# Patient Record
Sex: FEMALE | Race: OTHER | HISPANIC OR LATINO | Employment: UNEMPLOYED | URBAN - NONMETROPOLITAN AREA
[De-identification: names, ages, dates, MRNs, and addresses within clinical notes are randomized per-mention and may not be internally consistent; named-entity substitution may affect disease eponyms.]

---
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
## 2023-01-26 ENCOUNTER — APPOINTMENT (OUTPATIENT)
Dept: LAB | Facility: CLINIC | Age: 18
End: 2023-01-26

## 2023-01-26 ENCOUNTER — CONSULT (OUTPATIENT)
Dept: GASTROENTEROLOGY | Facility: CLINIC | Age: 18
End: 2023-01-26

## 2023-01-26 VITALS
SYSTOLIC BLOOD PRESSURE: 110 MMHG | HEIGHT: 63 IN | WEIGHT: 189 LBS | HEART RATE: 87 BPM | TEMPERATURE: 98.2 F | BODY MASS INDEX: 33.49 KG/M2 | DIASTOLIC BLOOD PRESSURE: 62 MMHG | OXYGEN SATURATION: 99 %

## 2023-01-26 DIAGNOSIS — R10.10 UPPER ABDOMINAL PAIN: ICD-10-CM

## 2023-01-26 DIAGNOSIS — R79.89 ABNORMAL LIVER FUNCTION TESTS: Primary | ICD-10-CM

## 2023-01-26 DIAGNOSIS — R79.89 ABNORMAL LIVER FUNCTION TESTS: ICD-10-CM

## 2023-01-26 DIAGNOSIS — R10.9 ABDOMINAL PAIN: ICD-10-CM

## 2023-01-26 LAB
ALBUMIN SERPL BCP-MCNC: 3.6 G/DL (ref 3.5–5)
ALP SERPL-CCNC: 107 U/L (ref 46–116)
ALT SERPL W P-5'-P-CCNC: 18 U/L (ref 12–78)
ANION GAP SERPL CALCULATED.3IONS-SCNC: 6 MMOL/L (ref 4–13)
AST SERPL W P-5'-P-CCNC: 11 U/L (ref 5–45)
BILIRUB SERPL-MCNC: 0.59 MG/DL (ref 0.2–1)
BUN SERPL-MCNC: 6 MG/DL (ref 5–25)
CALCIUM SERPL-MCNC: 9.1 MG/DL (ref 8.3–10.1)
CHLORIDE SERPL-SCNC: 110 MMOL/L (ref 96–108)
CO2 SERPL-SCNC: 26 MMOL/L (ref 21–32)
CREAT SERPL-MCNC: 0.81 MG/DL (ref 0.6–1.3)
FERRITIN SERPL-MCNC: 14 NG/ML (ref 8–388)
GFR SERPL CREATININE-BSD FRML MDRD: 104 ML/MIN/1.73SQ M
GLUCOSE P FAST SERPL-MCNC: 107 MG/DL (ref 65–99)
POTASSIUM SERPL-SCNC: 3.3 MMOL/L (ref 3.5–5.3)
PROT SERPL-MCNC: 7.6 G/DL (ref 6.4–8.4)
SODIUM SERPL-SCNC: 142 MMOL/L (ref 135–147)
TSH SERPL DL<=0.05 MIU/L-ACNC: 3.25 UIU/ML (ref 0.45–4.5)

## 2023-01-26 RX ORDER — OMEPRAZOLE 20 MG/1
20 CAPSULE, DELAYED RELEASE ORAL DAILY
Qty: 30 CAPSULE | Refills: 11 | Status: SHIPPED | OUTPATIENT
Start: 2023-01-26

## 2023-01-26 NOTE — PROGRESS NOTES
[facility] - Outpatient Consultation  Poornima Mercado 19 y o  female MRN: [id number]  Encounter: [id number]          ASSESSMENT AND PLAN:    18F with history of gallstone pancreatitis s/p cholecystectomy referred from the ED with upper abdominal pain and abnormal liver test   Upper abdominal pain is atypical for a GI etiology given it sounds pleuritic however she does endorse improvement with PPI suggesting there may be a peptic component to it  Transaminases significantly elevated in December, will repeat these today and assess for underlying liver disease  1  Abdominal pain  - Ambulatory Referral to Gastroenterology    2  Abnormal liver function tests  - US right upper quadrant; Future  - Comprehensive metabolic panel; Future  - Anti-smooth muscle antibody, IgG; Future  - Ceruloplasmin; Future  - Ferritin; Future  - Antimitochondrial antibody; Future  - TSH, 3rd generation; Future  - Alpha 1 Antitrypsin Phenotype; Future    3  Upper abdominal pain  - US right upper quadrant; Future  - omeprazole (PriLOSEC) 20 mg delayed release capsule; Take 1 capsule (20 mg total) by mouth daily  Dispense: 30 capsule; Refill: 11  -If no etiology present on ultrasound and no resolution on omeprazole, may require EGD    ______________________________________________________________________    HPI:    Has been having a lot of pain on both sides of upper abdomen  Has been going on for 2 years  Pain feels worse with breathing in, feels like a pushing  Comes and goes, usually lasts for about an hour  Initially started with LUQ  Not affected by eating  No alleviating factors  ED gave her pantoprazole which helped  Pain came back when she ran out of pills  Also given dicycolmine, doesn't think it helped  No heartburn, nausea, vomiting, dysphagia  No weight loss, has gained 35# over 6 months  Had gallbladder removed summer 2022 after gallstone pancreatitis      No constipation, diarrhea, or blood in stool     No family history of IBD or GI malignancy  Vapes nicotine but quit when abdominal pain started  No alcohol  Reviewed 12/2/22 ED documentation    REVIEW OF SYSTEMS:    CONSTITUTIONAL: Denies any fever, chills, rigors, and weight loss  HEENT: No earache or tinnitus  Denies hearing loss or visual disturbances  CARDIOVASCULAR: No chest pain or palpitations  RESPIRATORY: Denies any cough, hemoptysis, shortness of breath or dyspnea on exertion  GASTROINTESTINAL: As noted in the History of Present Illness  GENITOURINARY: No problems with urination  Denies any hematuria or dysuria  NEUROLOGIC: No dizziness or vertigo, denies headaches  MUSCULOSKELETAL: Denies any muscle or joint pain  SKIN: Denies skin rashes or itching  ENDOCRINE: Denies excessive thirst  Denies intolerance to heat or cold  PSYCHOSOCIAL: Denies depression or anxiety  Denies any recent memory loss         Historical Information   Past Medical History:   Diagnosis Date   • Asthma      Past Surgical History:   Procedure Laterality Date   • CHOLECYSTECTOMY LAPAROSCOPIC N/A 7/15/2022    Procedure: CHOLECYSTECTOMY LAPAROSCOPIC;  Surgeon: Jorge Walker DO;  Location: [facility];  Service: General     Social History   Social History     Substance and Sexual Activity   Alcohol Use Never     Social History     Substance and Sexual Activity   Drug Use Never     Social History     Tobacco Use   Smoking Status Never   Smokeless Tobacco Never     Family History   Problem Relation Age of Onset   • No Known Problems Father        Meds/Allergies       Current Outpatient Medications:   •  dicyclomine (BENTYL) 20 mg tablet  •  medroxyPROGESTERone acetate (DEPO-PROVERA SYRINGE) 150 mg/mL injection  •  naproxen (Naprosyn) 500 mg tablet  •  omeprazole (PriLOSEC) 20 mg delayed release capsule  •  pantoprazole (PROTONIX) 20 mg tablet    No Known Allergies        Objective     Blood pressure 110/62, pulse 87, temperature 98 2 °F (36 8 °C), temperature source Temporal, height 5' 3" (1 6 m), weight 85 7 kg (189 lb), SpO2 99 %  Body mass index is 33 48 kg/m²  PHYSICAL EXAM:      General Appearance:   Alert, cooperative, no distress   HEENT:   Normocephalic, atraumatic, anicteric  Neck:  Supple, symmetrical, trachea midline   Lungs:   Clear to auscultation bilaterally; no rales, rhonchi or wheezing; respirations unlabored    Heart[de-identified]   Regular rate and rhythm; no murmur, rub, or gallop  Abdomen:   Soft, non-tender, non-distended; normal bowel sounds; no masses, no organomegaly    Genitalia:   Deferred    Rectal:   Deferred    Extremities:  No cyanosis, clubbing or edema    Pulses:  2+ and symmetric    Skin:  No jaundice, rashes, or lesions    Lymph nodes:  No palpable cervical lymphadenopathy        Lab Results:   No visits with results within 1 Day(s) from this visit     Latest known visit with results is:   Admission on 12/02/2022, Discharged on 12/02/2022   Component Date Value   • WBC 12/02/2022 6 55    • RBC 12/02/2022 4 74    • Hemoglobin 12/02/2022 13 1    • Hematocrit 12/02/2022 40 8    • MCV 12/02/2022 86    • MCH 12/02/2022 27 6    • MCHC 12/02/2022 32 1    • RDW 12/02/2022 12 7    • MPV 12/02/2022 10 5    • Platelets 11/69/8753 321    • nRBC 12/02/2022 0    • Neutrophils Relative 12/02/2022 67    • Immat GRANS % 12/02/2022 0    • Lymphocytes Relative 12/02/2022 24    • Monocytes Relative 12/02/2022 6    • Eosinophils Relative 12/02/2022 2    • Basophils Relative 12/02/2022 1    • Neutrophils Absolute 12/02/2022 4 35    • Immature Grans Absolute 12/02/2022 0 01    • Lymphocytes Absolute 12/02/2022 1 60    • Monocytes Absolute 12/02/2022 0 41    • Eosinophils Absolute 12/02/2022 0 12    • Basophils Absolute 12/02/2022 0 06    • Sodium 12/02/2022 138    • Potassium 12/02/2022 3 6    • Chloride 12/02/2022 104    • CO2 12/02/2022 27    • ANION GAP 12/02/2022 7    • BUN 12/02/2022 8    • Creatinine 12/02/2022 0 79    • Glucose 12/02/2022 100    • Calcium 12/02/2022 9 2    • AST 12/02/2022 164 (H)    • ALT 12/02/2022 524 (H)    • Alkaline Phosphatase 12/02/2022 223    • Total Protein 12/02/2022 8 6 (H)    • Albumin 12/02/2022 4 2    • Total Bilirubin 12/02/2022 0 83    • eGFR 12/02/2022 108    • Lipase 12/02/2022 127    • EXT Preg Test, Ur 12/02/2022 Negative    • Control 12/02/2022 Valid    • Color, UA 12/02/2022 Yellow    • Clarity, UA 12/02/2022 Clear    • Specific Gravity, UA 12/02/2022 >=1 030    • pH, UA 12/02/2022 6 0    • Leukocytes, UA 12/02/2022 Negative    • Nitrite, UA 12/02/2022 Negative    • Protein, UA 12/02/2022 Negative    • Glucose, UA 12/02/2022 Negative    • Ketones, UA 12/02/2022 Negative    • Urobilinogen, UA 12/02/2022 0 2    • Bilirubin, UA 12/02/2022 Small (A)    • Occult Blood, UA 12/02/2022 Trace-Intact (A)    • RBC, UA 12/02/2022 1-2    • WBC, UA 12/02/2022 2-4    • Epithelial Cells 12/02/2022 Moderate (A)    • Bacteria, UA 12/02/2022 Moderate (A)    • Ventricular Rate 12/02/2022 78    • Atrial Rate 12/02/2022 78    • HI Interval 12/02/2022 158    • QRSD Interval 12/02/2022 80    • QT Interval 12/02/2022 384    • QTC Interval 12/02/2022 437    • P Axis 12/02/2022 62    • QRS Axis 12/02/2022 44    • T Wave Axis 12/02/2022 31          Radiology Results:   No results found

## 2023-01-27 LAB
ACTIN IGG SERPL-ACNC: 8 UNITS (ref 0–19)
CERULOPLASMIN SERPL-MCNC: 27.2 MG/DL (ref 19–39)
MITOCHONDRIA M2 IGG SER-ACNC: <20 UNITS (ref 0–20)

## 2023-02-02 LAB
A1AT PHENOTYP SERPL IFE: NORMAL
A1AT SERPL-MCNC: 145 MG/DL (ref 100–188)

## 2023-02-07 ENCOUNTER — HOSPITAL ENCOUNTER (OUTPATIENT)
Dept: ULTRASOUND IMAGING | Facility: HOSPITAL | Age: 18
Discharge: HOME | End: 2023-02-07
Attending: STUDENT IN AN ORGANIZED HEALTH CARE EDUCATION/TRAINING PROGRAM

## 2023-02-07 DIAGNOSIS — R10.10 UPPER ABDOMINAL PAIN: ICD-10-CM

## 2023-02-07 DIAGNOSIS — R79.89 ABNORMAL LIVER FUNCTION TESTS: ICD-10-CM

## 2023-02-22 ENCOUNTER — OFFICE VISIT (OUTPATIENT)
Dept: DENTISTRY | Facility: CLINIC | Age: 18
End: 2023-02-22

## 2023-02-22 VITALS — DIASTOLIC BLOOD PRESSURE: 81 MMHG | SYSTOLIC BLOOD PRESSURE: 118 MMHG

## 2023-02-22 DIAGNOSIS — K03.6 ACCRETIONS ON TEETH: Primary | ICD-10-CM

## 2023-02-22 NOTE — PROGRESS NOTES
Adult Prophy  Chief Complaint   Patient presents with   • Routine Oral Cleaning    No dental concerns or issues   Pt missed her comp exam- I rescheduled the appt today  Method Used:  · Prophy Method Used: Hand Scaling  · Polished  · Flossed  Fluoride    Radiographs Taken in [facility]: (Taken to assess periodontal health)  · None      Intra/Extra Oral Cancer Screening:  · Within normal limits    Oral Hygiene:  · Fair    Plaque:  · Light    Calculus:  · Light    Bleeding:  · Light    Stain:  · Light    Periodontal Charting: Showed patient sub calc specks and bone height  · Spot probing  1-4mm    Periodontal Classification:  · Generalized  · Mild  · Gingivitis    Oral Hygiene Instruction:    Went over daily routine and c-shaped flossing  Demonstrated flossing  No orders of the defined types were placed in this encounter         Next Visit:  6 Month jaden EVANS  Dentist visit- comp

## 2023-03-20 ENCOUNTER — OFFICE VISIT (OUTPATIENT)
Dept: DENTISTRY | Facility: CLINIC | Age: 18
End: 2023-03-20

## 2023-03-20 VITALS — SYSTOLIC BLOOD PRESSURE: 108 MMHG | HEART RATE: 77 BPM | DIASTOLIC BLOOD PRESSURE: 72 MMHG

## 2023-03-20 DIAGNOSIS — Z01.20 ENCOUNTER FOR DENTAL EXAMINATION: Primary | ICD-10-CM

## 2023-03-20 NOTE — PROGRESS NOTES
Comprehensive Exam    Mookie Watts presents for a comprehensive exam  Verbal consent for treatment given in addition to the forms  Reviewed health history - Patient is ASA    Consents signed: Yes    Perio: Gingivitis  Pain Scale: 0  Caries Assessment: high  Radiographs:  Films are current    Oral Hygiene instruction reviewed and given  Hygiene recall visits recommended to the patient  Treatment Plan:  1  Periodontal therapy: prophy  2  Caries control: resins, reinforced OHI, nutritional counseling (no sipping on anything but water)  Pt aware that there are cavities starting between some teeth and to help prevent them from growing, pt needs to floss daily     3  3rd molar ext    Prognosis is Good  Referrals needed: Referred to OS for 3rd molar ext  Next visit: luzma

## 2023-04-03 ENCOUNTER — OFFICE VISIT (OUTPATIENT)
Dept: GASTROENTEROLOGY | Facility: CLINIC | Age: 18
End: 2023-04-03

## 2023-04-03 VITALS
SYSTOLIC BLOOD PRESSURE: 120 MMHG | OXYGEN SATURATION: 97 % | HEIGHT: 63 IN | RESPIRATION RATE: 16 BRPM | WEIGHT: 203 LBS | DIASTOLIC BLOOD PRESSURE: 60 MMHG | BODY MASS INDEX: 35.97 KG/M2 | TEMPERATURE: 97.7 F | HEART RATE: 74 BPM

## 2023-04-03 DIAGNOSIS — K85.10 GALLSTONE PANCREATITIS: ICD-10-CM

## 2023-04-03 DIAGNOSIS — R10.13 DYSPEPSIA: ICD-10-CM

## 2023-04-03 DIAGNOSIS — R79.89 ABNORMAL LIVER FUNCTION TESTS: ICD-10-CM

## 2023-04-03 DIAGNOSIS — R10.11 RUQ PAIN: Primary | ICD-10-CM

## 2023-04-03 RX ORDER — MEDROXYPROGESTERONE ACETATE 150 MG/ML
INJECTION, SUSPENSION INTRAMUSCULAR
COMMUNITY
Start: 2023-01-30

## 2023-04-03 NOTE — PROGRESS NOTES
[facility] - Outpatient Follow-up Note  Richelle Seo 19 y o  female MRN: [id number]  Encounter: [id number]    ASSESSMENT AND PLAN:      1  RUQ pain  2  Dyspepsia    Pt is s/p cholecystectomy  She recently underwent evaluation with RUQ US, which was wnl  She also had LFTs re-checked, and they were in normal range as of 01/2023  Interestingly, PPI helped with upper abdominal pain, though pt was not having any typical reflux symptoms  At this time, she is asymptomatic  Tolerating PO without issue, no alarm features  We discussed continuing on PPI now, as there may have been a peptic process associated with upper abdominal pain  We discussed plan to taper PPI in follow up  Recommend continuing with small frequent meals, low in saturated fats  Continue with anti-reflux diet and lifestyle modifications  No alarm features that would warrant EGD at this time, thus we will continue to monitor conservatively  3  Gallstone pancreatitis  4  Abnormal liver function tests    Pt had elevated LFTs during hospitalization for gallstone pancreatitis, mixed pattern with elevated bilirubin  Viral hepatitis labs were negative  This abated after cholecystectomy however she had recurrence of elevated LFTs during ER evaluation for abdominal pain, purely in hepatocellular pattern  She had serologic evaluation which was negative for autoimmune and genetic abnormalities  LFTs have since normalized  We discussed latter elevation may have been secondary to infection, low flow state, or lower likelihood of passed stone  Liver parenchyma normal on recent US from 01/2023  At this time, we discussed continuing to monitor at this time, and will repeat in 3-6 months to ensure stability      - Comprehensive metabolic panel; Future    Plan to follow up in approx 6 months with labs prior  ______________________________________________________________________    SUBJECTIVE: Patient is a 19 y o  female who presents today for follow-up regarding abdominal pain  Past medical history significant for gallstone pancreatitis s/p cholecystectomy, asthma  She was initially evaluated in 07/2022 for abdominal pain found to have cholelithiasis and elevated LFTs  MRCP was negative, there was concern that she may have passed gallstones  She underwent a laparoscopic cholecystectomy during that admission  She subsequently saw Dr Kathryn Soliman in follow-up in 01/2023 as she was still having bilateral upper abdominal pain  Felt worse with breathing in, felt like a pressure  Pantoprazole helped  She underwent evaluation with right upper quadrant ultrasound, which was unremarkable  Her LFTs returned to normal when rechecked  04/03/23:     Patient is feeling so much better today  She denies any recurrence of upper abdominal pain  Denies having pain with deep breathing  She denies any significant heartburn, indigestion, nausea, vomiting, dysphagia or odynophagia  She denies any unintentional weight loss since last office visit  She denies any yellowing of the eyes or skin  No new abdominal swelling or lower extremity edema  No pruritus or confusion  She denies any issues moving her bowels, no chronic constipation or diarrhea  No abdominal pain or rectal pain related to defecation  No BRBPR or melena  No family history of CRC      Tobacco: none   Etoh: none   NSAIDs: prn     01/2023: RUQ US: normal   01/2023: AST 11, ALT 18, , albumin 3 6, T  bili 0 59, ASMA 8, ceruloplasmin 27 2, ferritin 14, AMA < 20, TSH 3 25, A1AT 145  12/2022: Hb 13 1, MCV 86, platelets 393, BUN 8, creatinine 0 79, , , , T  bili 0 83, lipase 127  12/2022: CT A/P: no acute pathology   07/2022: Acute and chronic hepatitis panel negative    Review of Systems   Per HPI    Historical Information   Past Medical History:   Diagnosis Date   • Asthma      Past Surgical History:   Procedure Laterality Date   • CHOLECYSTECTOMY LAPAROSCOPIC N/A 7/15/2022    Procedure: CHOLECYSTECTOMY LAPAROSCOPIC;  Surgeon: Rigoberto Wilson DO;  Location: [facility];  Service: General     Social History   Social History     Substance and Sexual Activity   Alcohol Use Never     Social History     Substance and Sexual Activity   Drug Use Never     Social History     Tobacco Use   Smoking Status Never   Smokeless Tobacco Never     Family History   Problem Relation Age of Onset   • No Known Problems Father        Meds/Allergies       Current Outpatient Medications:   •  medroxyPROGESTERone (DEPO-PROVERA) 150 mg/mL injection  •  medroxyPROGESTERone acetate (DEPO-PROVERA SYRINGE) 150 mg/mL injection  •  omeprazole (PriLOSEC) 20 mg delayed release capsule    No Known Allergies    Objective     There were no vitals taken for this visit  There is no height or weight on file to calculate BMI  Physical Exam  Vitals and nursing note reviewed  Constitutional:       Appearance: Normal appearance  HENT:      Head: Normocephalic and atraumatic  Eyes:      General: No scleral icterus  Conjunctiva/sclera: Conjunctivae normal    Cardiovascular:      Rate and Rhythm: Normal rate  Pulmonary:      Effort: Pulmonary effort is normal  No respiratory distress  Abdominal:      General: Abdomen is flat  Bowel sounds are normal  There is no distension  Palpations: Abdomen is soft  There is no mass  Tenderness: There is no abdominal tenderness  There is no guarding or rebound  Skin:     General: Skin is warm and dry  Coloration: Skin is not jaundiced  Neurological:      General: No focal deficit present  Mental Status: She is alert and oriented to person, place, and time  Psychiatric:         Mood and Affect: Mood normal          Behavior: Behavior normal        Lab Results:   No visits with results within 1 Day(s) from this visit     Latest known visit with results is:   Appointment on 01/26/2023   Component Date Value   • Sodium 01/26/2023 142    • Potassium 01/26/2023 3 3 (L)    • Chloride 01/26/2023 110 (H)    • CO2 01/26/2023 26    • ANION GAP 01/26/2023 6    • BUN 01/26/2023 6    • Creatinine 01/26/2023 0 81    • Glucose, Fasting 01/26/2023 107 (H)    • Calcium 01/26/2023 9 1    • AST 01/26/2023 11    • ALT 01/26/2023 18    • Alkaline Phosphatase 01/26/2023 107    • Total Protein 01/26/2023 7 6    • Albumin 01/26/2023 3 6    • Total Bilirubin 01/26/2023 0 59    • eGFR 01/26/2023 104    • Smooth Muscle Ab 01/26/2023 8    • Ceruloplasmin 01/26/2023 27 2    • Ferritin 01/26/2023 14    • Mitochondrial Ab 01/26/2023 <20 0    • TSH 3RD GENERATON 01/26/2023 3 250    • A-1 Antitrypsin 01/26/2023 145    • A-1 Antitrypsin Pheno 01/26/2023 MM      Radiology Results:   No results found  Rosalva Alexander PA-C    **Please note:  Dictation voice to text software may have been used in the creation of this record  Occasional wrong word or “sound alike” substitutions may have occurred due to the inherent limitations of voice recognition software  Read the chart carefully and recognize, using context, where substitutions have occurred  **

## 2023-05-05 ENCOUNTER — APPOINTMENT (OUTPATIENT)
Dept: LAB | Facility: MEDICAL CENTER | Age: 18
End: 2023-05-05

## 2023-05-05 DIAGNOSIS — R79.89 ABNORMAL LIVER FUNCTION TESTS: ICD-10-CM

## 2023-05-06 LAB
ALBUMIN SERPL BCP-MCNC: 3.9 G/DL (ref 3.5–5)
ALP SERPL-CCNC: 107 U/L (ref 46–116)
ALT SERPL W P-5'-P-CCNC: 20 U/L (ref 12–78)
ANION GAP SERPL CALCULATED.3IONS-SCNC: 3 MMOL/L (ref 4–13)
AST SERPL W P-5'-P-CCNC: 14 U/L (ref 5–45)
BILIRUB SERPL-MCNC: 0.52 MG/DL (ref 0.2–1)
BUN SERPL-MCNC: 7 MG/DL (ref 5–25)
CALCIUM SERPL-MCNC: 9.5 MG/DL (ref 8.3–10.1)
CHLORIDE SERPL-SCNC: 110 MMOL/L (ref 96–108)
CO2 SERPL-SCNC: 23 MMOL/L (ref 21–32)
CREAT SERPL-MCNC: 0.82 MG/DL (ref 0.6–1.3)
GFR SERPL CREATININE-BSD FRML MDRD: 103 ML/MIN/1.73SQ M
GLUCOSE SERPL-MCNC: 136 MG/DL (ref 65–140)
POTASSIUM SERPL-SCNC: 3.6 MMOL/L (ref 3.5–5.3)
PROT SERPL-MCNC: 8 G/DL (ref 6.4–8.4)
SODIUM SERPL-SCNC: 136 MMOL/L (ref 135–147)

## 2023-08-02 ENCOUNTER — APPOINTMENT (EMERGENCY)
Dept: RADIOLOGY | Facility: HOSPITAL | Age: 18
End: 2023-08-02
Payer: COMMERCIAL

## 2023-08-02 ENCOUNTER — HOSPITAL ENCOUNTER (EMERGENCY)
Facility: HOSPITAL | Age: 18
Discharge: HOME | End: 2023-08-02
Attending: EMERGENCY MEDICINE
Payer: COMMERCIAL

## 2023-08-02 ENCOUNTER — APPOINTMENT (EMERGENCY)
Dept: CT IMAGING | Facility: HOSPITAL | Age: 18
End: 2023-08-02
Payer: COMMERCIAL

## 2023-08-02 VITALS
OXYGEN SATURATION: 99 % | RESPIRATION RATE: 18 BRPM | DIASTOLIC BLOOD PRESSURE: 67 MMHG | HEART RATE: 96 BPM | TEMPERATURE: 97.8 F | SYSTOLIC BLOOD PRESSURE: 121 MMHG

## 2023-08-02 DIAGNOSIS — R19.00 ABDOMINAL MASS: ICD-10-CM

## 2023-08-02 DIAGNOSIS — R10.9 ABDOMINAL PAIN: Primary | ICD-10-CM

## 2023-08-02 LAB
ALBUMIN SERPL BCP-MCNC: 4.4 G/DL (ref 3.5–5)
ALP SERPL-CCNC: 118 U/L (ref 34–104)
ALT SERPL W P-5'-P-CCNC: 13 U/L (ref 7–52)
ANION GAP SERPL CALCULATED.3IONS-SCNC: 8 MMOL/L
AST SERPL W P-5'-P-CCNC: 17 U/L (ref 13–39)
BASOPHILS # BLD AUTO: 0.06 THOUSANDS/ÂΜL (ref 0–0.1)
BASOPHILS NFR BLD AUTO: 1 % (ref 0–1)
BILIRUB SERPL-MCNC: 0.46 MG/DL (ref 0.2–1)
BILIRUB UR QL STRIP: NEGATIVE
BUN SERPL-MCNC: 7 MG/DL (ref 5–25)
CALCIUM SERPL-MCNC: 9.8 MG/DL (ref 8.4–10.2)
CARDIAC TROPONIN I PNL SERPL HS: <2 NG/L
CHLORIDE SERPL-SCNC: 107 MMOL/L (ref 96–108)
CLARITY UR: CLEAR
CO2 SERPL-SCNC: 23 MMOL/L (ref 21–32)
COLOR UR: YELLOW
CREAT SERPL-MCNC: 0.71 MG/DL (ref 0.6–1.3)
D DIMER PPP FEU-MCNC: <0.27 UG/ML FEU
EOSINOPHIL # BLD AUTO: 0.08 THOUSAND/ÂΜL (ref 0–0.61)
EOSINOPHIL NFR BLD AUTO: 1 % (ref 0–6)
ERYTHROCYTE [DISTWIDTH] IN BLOOD BY AUTOMATED COUNT: 13.3 % (ref 11.6–15.1)
EXT PREGNANCY TEST URINE: NEGATIVE
EXT. CONTROL: NORMAL
GFR SERPL CREATININE-BSD FRML MDRD: 122 ML/MIN/1.73SQ M
GLUCOSE SERPL-MCNC: 96 MG/DL (ref 65–140)
GLUCOSE UR STRIP-MCNC: NEGATIVE MG/DL
HCT VFR BLD AUTO: 38.4 % (ref 34.8–46.1)
HGB BLD-MCNC: 12.3 G/DL (ref 11.5–15.4)
HGB UR QL STRIP.AUTO: NEGATIVE
IMM GRANULOCYTES # BLD AUTO: 0.03 THOUSAND/UL (ref 0–0.2)
IMM GRANULOCYTES NFR BLD AUTO: 0 % (ref 0–2)
KETONES UR STRIP-MCNC: NEGATIVE MG/DL
LEUKOCYTE ESTERASE UR QL STRIP: NEGATIVE
LIPASE SERPL-CCNC: 22 U/L (ref 11–82)
LYMPHOCYTES # BLD AUTO: 2.35 THOUSANDS/ÂΜL (ref 0.6–4.47)
LYMPHOCYTES NFR BLD AUTO: 22 % (ref 14–44)
MCH RBC QN AUTO: 26.7 PG (ref 26.8–34.3)
MCHC RBC AUTO-ENTMCNC: 32 G/DL (ref 31.4–37.4)
MCV RBC AUTO: 84 FL (ref 82–98)
MONOCYTES # BLD AUTO: 0.84 THOUSAND/ÂΜL (ref 0.17–1.22)
MONOCYTES NFR BLD AUTO: 8 % (ref 4–12)
NEUTROPHILS # BLD AUTO: 7.33 THOUSANDS/ÂΜL (ref 1.85–7.62)
NEUTS SEG NFR BLD AUTO: 68 % (ref 43–75)
NITRITE UR QL STRIP: NEGATIVE
NRBC BLD AUTO-RTO: 0 /100 WBCS
PH UR STRIP.AUTO: 6 [PH]
PLATELET # BLD AUTO: 336 THOUSANDS/UL (ref 149–390)
PMV BLD AUTO: 10.1 FL (ref 8.9–12.7)
POTASSIUM SERPL-SCNC: 3.7 MMOL/L (ref 3.5–5.3)
PROT SERPL-MCNC: 8 G/DL (ref 6.4–8.4)
PROT UR STRIP-MCNC: NEGATIVE MG/DL
RBC # BLD AUTO: 4.6 MILLION/UL (ref 3.81–5.12)
SODIUM SERPL-SCNC: 138 MMOL/L (ref 135–147)
SP GR UR STRIP.AUTO: >=1.03 (ref 1–1.03)
UROBILINOGEN UR QL STRIP.AUTO: 0.2 E.U./DL
WBC # BLD AUTO: 10.69 THOUSAND/UL (ref 4.31–10.16)

## 2023-08-02 PROCEDURE — 81003 URINALYSIS AUTO W/O SCOPE: CPT | Performed by: PHYSICIAN ASSISTANT

## 2023-08-02 PROCEDURE — G1004 CDSM NDSC: HCPCS

## 2023-08-02 PROCEDURE — 99284 EMERGENCY DEPT VISIT MOD MDM: CPT | Performed by: PHYSICIAN ASSISTANT

## 2023-08-02 PROCEDURE — 71045 X-RAY EXAM CHEST 1 VIEW: CPT

## 2023-08-02 PROCEDURE — 85025 COMPLETE CBC W/AUTO DIFF WBC: CPT | Performed by: PHYSICIAN ASSISTANT

## 2023-08-02 PROCEDURE — 80053 COMPREHEN METABOLIC PANEL: CPT | Performed by: PHYSICIAN ASSISTANT

## 2023-08-02 PROCEDURE — 84484 ASSAY OF TROPONIN QUANT: CPT | Performed by: PHYSICIAN ASSISTANT

## 2023-08-02 PROCEDURE — 74177 CT ABD & PELVIS W/CONTRAST: CPT

## 2023-08-02 PROCEDURE — 81025 URINE PREGNANCY TEST: CPT | Performed by: PHYSICIAN ASSISTANT

## 2023-08-02 PROCEDURE — 85379 FIBRIN DEGRADATION QUANT: CPT | Performed by: PHYSICIAN ASSISTANT

## 2023-08-02 PROCEDURE — 83690 ASSAY OF LIPASE: CPT | Performed by: PHYSICIAN ASSISTANT

## 2023-08-02 PROCEDURE — 93005 ELECTROCARDIOGRAM TRACING: CPT

## 2023-08-02 PROCEDURE — 36415 COLL VENOUS BLD VENIPUNCTURE: CPT | Performed by: PHYSICIAN ASSISTANT

## 2023-08-02 RX ADMIN — IOHEXOL 100 ML: 350 INJECTION, SOLUTION INTRAVENOUS at 10:45

## 2023-08-02 NOTE — Clinical Note
David April was seen and treated in our emergency department on 8/2/2023. No restrictions            Diagnosis: abd pain    Zeenat  . She may return on this date: 08/03/2023         If you have any questions or concerns, please don't hesitate to call.       Flora Neely PA-C    ______________________________           _______________          _______________  Hospital Representative                              Date                                Time

## 2023-08-02 NOTE — Clinical Note
Kyle Farfan accompanied Kvng Singh to the emergency department on 8/2/2023. Return date if applicable: 14/52/5386    Aidan Osbaldo is excused from work on 8/2/23    If you have any questions or concerns, please don't hesitate to call.       Ana Rosa Cool PA-C

## 2023-08-02 NOTE — ED PROVIDER NOTES
History  Chief Complaint   Patient presents with   • Abdominal Pain     Pt c/o left sided abdominal pain for 2 weeks, usually takes Omeprazole but states it recently stopped working. Denies n/v/d     This is a 68-year-old female who presents via private vehicle with family stating that she has been having left upper quadrant/left lower chest pain for about 2 weeks. She states initially was intermittent however over the last 12 hours it has become more severe and more constant. There is no other associated symptoms of nausea vomiting diarrhea or constipation she denies urinary urgency frequency or burning. Her menstrual cycle according to her is nonexistent secondary to the Depo shot that she had over 1 year ago. She denies any back pain. She does admit to pain in this region that is worse with deep inspiration however. She does see GI, she is on omeprazole. She has been taking that as prescribed without any change in the pain. She feels that this is not reflux. Prior to Admission Medications   Prescriptions Last Dose Informant Patient Reported? Taking? medroxyPROGESTERone (DEPO-PROVERA) 150 mg/mL injection   Yes No   omeprazole (PriLOSEC) 20 mg delayed release capsule 8/2/2023  No Yes   Sig: Take 1 capsule (20 mg total) by mouth daily      Facility-Administered Medications: None       Past Medical History:   Diagnosis Date   • Asthma        Past Surgical History:   Procedure Laterality Date   • CHOLECYSTECTOMY LAPAROSCOPIC N/A 7/15/2022    Procedure: CHOLECYSTECTOMY LAPAROSCOPIC;  Surgeon: Dyana Kennedy DO;  Location: [facility];  Service: General       Family History   Problem Relation Age of Onset   • No Known Problems Father      I have reviewed and agree with the history as documented.     E-Cigarette/Vaping   • E-Cigarette Use Current Every Day User      E-Cigarette/Vaping Substances   • Nicotine Yes      Social History     Tobacco Use   • Smoking status: Never   • Smokeless tobacco: Never Vaping Use   • Vaping Use: Every day   • Substances: Nicotine   Substance Use Topics   • Alcohol use: Never   • Drug use: Never       Review of Systems   Constitutional: Negative for chills and fever. HENT: Negative for ear pain and sore throat. Eyes: Negative for pain and visual disturbance. Respiratory: Negative for cough and shortness of breath. Cardiovascular: Negative for chest pain and palpitations. Gastrointestinal: Positive for abdominal pain. Negative for constipation, diarrhea, nausea and vomiting. Genitourinary: Negative for dysuria and hematuria. Musculoskeletal: Negative for arthralgias and back pain. Skin: Negative for color change and rash. Neurological: Negative for seizures and syncope. All other systems reviewed and are negative. Physical Exam  Physical Exam  Vitals reviewed. Constitutional:       General: She is not in acute distress. Appearance: She is well-developed. She is obese. She is not ill-appearing, toxic-appearing or diaphoretic. HENT:      Right Ear: External ear normal. No swelling. Tympanic membrane is not bulging. Left Ear: External ear normal. No swelling. Tympanic membrane is not bulging. Nose: Nose normal.      Mouth/Throat:      Pharynx: No oropharyngeal exudate. Eyes:      General: Lids are normal.      Conjunctiva/sclera: Conjunctivae normal.      Pupils: Pupils are equal, round, and reactive to light. Neck:      Thyroid: No thyromegaly. Vascular: No JVD. Trachea: No tracheal deviation. Cardiovascular:      Rate and Rhythm: Normal rate and regular rhythm. Pulses: Normal pulses. Heart sounds: Normal heart sounds. No murmur heard. No friction rub. No gallop. Pulmonary:      Effort: Pulmonary effort is normal. No respiratory distress. Breath sounds: Normal breath sounds. No stridor. No wheezing or rales. Chest:      Chest wall: No tenderness.    Abdominal:      General: Bowel sounds are normal. There is no distension. Palpations: Abdomen is soft. There is no mass. Tenderness: There is abdominal tenderness in the left upper quadrant. There is no guarding or rebound. Negative signs include Luis's sign. Hernia: No hernia is present. Musculoskeletal:         General: No tenderness. Normal range of motion. Cervical back: Normal range of motion and neck supple. No edema. Normal range of motion. Lymphadenopathy:      Cervical: No cervical adenopathy. Skin:     General: Skin is warm and dry. Capillary Refill: Capillary refill takes less than 2 seconds. Coloration: Skin is not pale. Findings: No erythema or rash. Neurological:      Mental Status: She is alert and oriented to person, place, and time. GCS: GCS eye subscore is 4. GCS verbal subscore is 5. GCS motor subscore is 6. Cranial Nerves: No cranial nerve deficit. Sensory: No sensory deficit. Deep Tendon Reflexes: Reflexes are normal and symmetric.    Psychiatric:         Speech: Speech normal.         Behavior: Behavior normal.         Vital Signs  ED Triage Vitals [08/02/23 0901]   Temperature Pulse Respirations Blood Pressure SpO2   97.8 °F (36.6 °C) 97 18 120/78 99 %      Temp Source Heart Rate Source Patient Position - Orthostatic VS BP Location FiO2 (%)   Temporal Monitor Sitting Left arm --      Pain Score       9           Vitals:    08/02/23 0901 08/02/23 1030   BP: 120/78 121/67   Pulse: 97 96   Patient Position - Orthostatic VS: Sitting Sitting         Visual Acuity      ED Medications  Medications   iohexol (OMNIPAQUE) 350 MG/ML injection (SINGLE-DOSE) 100 mL (100 mL Intravenous Given 8/2/23 1045)       Diagnostic Studies  Results Reviewed     Procedure Component Value Units Date/Time    HS Troponin 0hr (reflex protocol) [[id number]]  (Normal) Collected: 08/02/23 0924    Lab Status: Final result Specimen: Blood from Arm, Left Updated: 08/02/23 0956     hs TnI 0hr <2 ng/L Comprehensive metabolic panel [[id number]]  (Abnormal) Collected: 08/02/23 0924    Lab Status: Final result Specimen: Blood from Arm, Left Updated: 08/02/23 0951     Sodium 138 mmol/L      Potassium 3.7 mmol/L      Chloride 107 mmol/L      CO2 23 mmol/L      ANION GAP 8 mmol/L      BUN 7 mg/dL      Creatinine 0.71 mg/dL      Glucose 96 mg/dL      Calcium 9.8 mg/dL      AST 17 U/L      ALT 13 U/L      Alkaline Phosphatase 118 U/L      Total Protein 8.0 g/dL      Albumin 4.4 g/dL      Total Bilirubin 0.46 mg/dL      eGFR 122 ml/min/1.73sq m     Narrative:      National Kidney Disease Foundation guidelines for Chronic Kidney Disease (CKD):   •  Stage 1 with normal or high GFR (GFR > 90 mL/min/1.73 square meters)  •  Stage 2 Mild CKD (GFR = 60-89 mL/min/1.73 square meters)  •  Stage 3A Moderate CKD (GFR = 45-59 mL/min/1.73 square meters)  •  Stage 3B Moderate CKD (GFR = 30-44 mL/min/1.73 square meters)  •  Stage 4 Severe CKD (GFR = 15-29 mL/min/1.73 square meters)  •  Stage 5 End Stage CKD (GFR <15 mL/min/1.73 square meters)  Note: GFR calculation is accurate only with a steady state creatinine    Lipase [[id number]]  (Normal) Collected: 08/02/23 0924    Lab Status: Final result Specimen: Blood from Arm, Left Updated: 08/02/23 0951     Lipase 22 u/L     D-Dimer [[id number]]  (Normal) Collected: 08/02/23 0924    Lab Status: Final result Specimen: Blood from Arm, Left Updated: 08/02/23 0943     D-Dimer, Catina Failing <0.27 ug/ml FEU     UA (URINE) with reflex to Scope [[id number]] Collected: 08/02/23 0924    Lab Status: Final result Specimen: Urine, Clean Catch Updated: 08/02/23 0932     Color, UA Yellow     Clarity, UA Clear     Specific Gravity, UA >=1.030     pH, UA 6.0     Leukocytes, UA Negative     Nitrite, UA Negative     Protein, UA Negative mg/dl      Glucose, UA Negative mg/dl      Ketones, UA Negative mg/dl      Urobilinogen, UA 0.2 E.U./dl      Bilirubin, UA Negative     Occult Blood, UA Negative    CBC and differential [[id number]]  (Abnormal) Collected: 08/02/23 0924    Lab Status: Final result Specimen: Blood from Arm, Left Updated: 08/02/23 0931     WBC 10.69 Thousand/uL      RBC 4.60 Million/uL      Hemoglobin 12.3 g/dL      Hematocrit 38.4 %      MCV 84 fL      MCH 26.7 pg      MCHC 32.0 g/dL      RDW 13.3 %      MPV 10.1 fL      Platelets 027 Thousands/uL      nRBC 0 /100 WBCs      Neutrophils Relative 68 %      Immat GRANS % 0 %      Lymphocytes Relative 22 %      Monocytes Relative 8 %      Eosinophils Relative 1 %      Basophils Relative 1 %      Neutrophils Absolute 7.33 Thousands/µL      Immature Grans Absolute 0.03 Thousand/uL      Lymphocytes Absolute 2.35 Thousands/µL      Monocytes Absolute 0.84 Thousand/µL      Eosinophils Absolute 0.08 Thousand/µL      Basophils Absolute 0.06 Thousands/µL     POCT pregnancy, urine [[id number]]  (Normal) Resulted: 08/02/23 0926    Lab Status: Final result Updated: 08/02/23 0926     EXT Preg Test, Ur Negative     Control Valid                 CT abdomen pelvis with contrast   Final Result by Shania Acevedo MD (08/02 1140)      1. No acute abnormality in the abdomen or pelvis. 2. 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 MRI abdomen with and without contrast is recommended for further evaluation. The study was marked in [facility] for immediate notification. Workstation performed: [id number]         XR chest 1 view portable   Final Result by Mikey Farfan MD (64/60 [id number])      No acute cardiopulmonary disease.       Findings are stable            Workstation performed: [id number]                    Procedures  ECG 12 Lead Documentation Only    Date/Time: 8/2/2023 9:51 AM    Performed by: Flora Neely PA-C  Authorized by: Flora Neely PA-C    Indications / Diagnosis:  LL chest pain  ECG reviewed by me, the ED Provider: yes    Patient location:  ED  Previous ECG:     Comparison to cardiac monitor: Yes    Interpretation:     Interpretation: normal    Rate:     ECG rate:  79    ECG rate assessment: normal    Rhythm:     Rhythm: sinus rhythm    Ectopy:     Ectopy: none    QRS:     QRS axis:  Normal    QRS intervals:  Normal  Conduction:     Conduction: normal    ST segments:     ST segments:  Normal  T waves:     T waves: normal               ED Course  ED Course as of 08/02/23 1200   Wed Aug 02, 2023   0904 SpO2: 99 %   0904 Respirations: 18   0904 Pulse: 97   0904 Temperature: 97.8 °F (36.6 °C)   0904 Blood Pressure: 120/78   0904 Vs reviewed, wnl   1033 IMPRESSION:     No acute cardiopulmonary disease.     Findings are stable      1143 ABDOMINOPELVIC CAVITY: There is a 2.4 x 2.2 x 2.8 cm retroperitoneal versus mesenteric mass anteroinferior to the third portion of the duodenum and anterior to the IVC, not significantly changed in size compared to prior studies dating back to MRI   7/12/2022. No ascites. No pneumoperitoneum. CRAFFT    Flowsheet Row Most Recent Value   CRAFFT Initial Screen: During the past 12 months, did you:    1. Drink any alcohol (more than a few sips)? No Filed at: 08/02/2023 0901   2. Smoke any marijuana or hashish No Filed at: 08/02/2023 0901   3. Use anything else to get high? ("anything else" includes illegal drugs, over the counter and prescription drugs, and things that you sniff or 'tony')?  No Filed at: 08/02/2023 0901          HEART Risk Score    Flowsheet Row Most Recent Value   Heart Score Risk Calculator    History 0 Filed at: 08/02/2023 0954   ECG 0 Filed at: 08/02/2023 0954   Age 0 Filed at: 08/02/2023 0954   Risk Factors 0 Filed at: 08/02/2023 0954   Troponin 0 Filed at: 08/02/2023 0954   HEART Score 0 Filed at: 08/02/2023 8915 Medical Decision Making  68-year-old female here for evaluation of left lower chest pain/left upper abdominal pain. Ongoing intermittently for 2 weeks worse over the last 12 hours. There does appear to be a mild pleuritic component pain is worse with deep inspiration she will be worked up for acute coronary syndrome, pneumothorax, pneumonia, pulmonary embolism. D-dimer chest x-ray EKG troponin normal.  Lipase normal, CT scan noting 2 cm mass somewhere either in the  lipase normal.  CT scan noted the mass is not significantly changed in size from prior MRI from over a year ago. I would recommend follow-up with primary care/GI to have them order an MRI with and without contrast of the abdomen to further characterize this anomaly. Amount and/or Complexity of Data Reviewed  Labs: ordered. Radiology: ordered. Risk  Prescription drug management. Disposition  Final diagnoses:   Abdominal pain   Abdominal mass     Time reflects when diagnosis was documented in both MDM as applicable and the Disposition within this note     Time User Action Codes Description Comment    8/2/2023 11:46 AM Astrid SARGENT Add [R10.9] Abdominal pain     8/2/2023 11:47 AM Chari Velasquez Add [R19.00] Abdominal mass       ED Disposition     ED Disposition   Discharge    Condition   Stable    Date/Time   Wed Aug 2, 2023 11:46 AM    Comment   Leighann Cutter discharge to home/self care. Follow-up Information     Follow up With Specialties Details Why Contact Info    Family Doctor  Call       GI doctor  Call             Patient's Medications   Discharge Prescriptions    No medications on file       No discharge procedures on file.     PDMP Review     None          ED Provider  Electronically Signed by           Иван Allan PA-C  08/02/23 1200

## 2023-08-02 NOTE — DISCHARGE INSTRUCTIONS
You must have primary care or GI order you a MRI with and without contrast of the abdomen to further characterize this 2 cm region in the abdomen which is not significantly changed in size from prior CT scan July 2022.

## 2023-08-03 LAB
ATRIAL RATE: 79 BPM
P AXIS: 15 DEGREES
PR INTERVAL: 142 MS
QRS AXIS: 43 DEGREES
QRSD INTERVAL: 84 MS
QT INTERVAL: 378 MS
QTC INTERVAL: 433 MS
T WAVE AXIS: 21 DEGREES
VENTRICULAR RATE: 79 BPM

## 2023-08-03 PROCEDURE — 93010 ELECTROCARDIOGRAM REPORT: CPT | Performed by: INTERNAL MEDICINE

## 2023-08-08 ENCOUNTER — TELEPHONE (OUTPATIENT)
Age: 18
End: 2023-08-08

## 2023-08-08 NOTE — TELEPHONE ENCOUNTER
Patient would like to know if there was any way she could have an earlier time for her 8/10 appointment. Please call patient back to discuss.

## 2023-09-25 ENCOUNTER — APPOINTMENT (OUTPATIENT)
Dept: LAB | Facility: HOSPITAL | Age: 18
End: 2023-09-25
Payer: COMMERCIAL

## 2023-09-25 ENCOUNTER — OFFICE VISIT (OUTPATIENT)
Dept: GASTROENTEROLOGY | Facility: CLINIC | Age: 18
End: 2023-09-25
Payer: COMMERCIAL

## 2023-09-25 VITALS
HEIGHT: 63 IN | DIASTOLIC BLOOD PRESSURE: 81 MMHG | OXYGEN SATURATION: 97 % | BODY MASS INDEX: 39.34 KG/M2 | WEIGHT: 222 LBS | SYSTOLIC BLOOD PRESSURE: 115 MMHG | TEMPERATURE: 97.7 F | HEART RATE: 83 BPM

## 2023-09-25 DIAGNOSIS — R74.8 ELEVATED ALKALINE PHOSPHATASE LEVEL: ICD-10-CM

## 2023-09-25 DIAGNOSIS — R10.12 LEFT UPPER QUADRANT ABDOMINAL PAIN: Primary | ICD-10-CM

## 2023-09-25 DIAGNOSIS — R10.10 UPPER ABDOMINAL PAIN: ICD-10-CM

## 2023-09-25 PROBLEM — K85.10 ACUTE GALLSTONE PANCREATITIS: Status: RESOLVED | Noted: 2022-07-12 | Resolved: 2023-09-25

## 2023-09-25 LAB
ALBUMIN SERPL BCP-MCNC: 4.3 G/DL (ref 3.5–5)
ALP SERPL-CCNC: 117 U/L (ref 34–104)
ALT SERPL W P-5'-P-CCNC: 12 U/L (ref 7–52)
ANION GAP SERPL CALCULATED.3IONS-SCNC: 9 MMOL/L
AST SERPL W P-5'-P-CCNC: 14 U/L (ref 13–39)
BILIRUB SERPL-MCNC: 0.87 MG/DL (ref 0.2–1)
BUN SERPL-MCNC: 7 MG/DL (ref 5–25)
CALCIUM SERPL-MCNC: 9.5 MG/DL (ref 8.4–10.2)
CHLORIDE SERPL-SCNC: 106 MMOL/L (ref 96–108)
CO2 SERPL-SCNC: 23 MMOL/L (ref 21–32)
CREAT SERPL-MCNC: 0.7 MG/DL (ref 0.6–1.3)
GFR SERPL CREATININE-BSD FRML MDRD: 125 ML/MIN/1.73SQ M
GGT SERPL-CCNC: 15 U/L (ref 9–64)
GLUCOSE SERPL-MCNC: 89 MG/DL (ref 65–140)
POTASSIUM SERPL-SCNC: 3.8 MMOL/L (ref 3.5–5.3)
PROT SERPL-MCNC: 7.4 G/DL (ref 6.4–8.4)
SODIUM SERPL-SCNC: 138 MMOL/L (ref 135–147)

## 2023-09-25 PROCEDURE — 99213 OFFICE O/P EST LOW 20 MIN: CPT | Performed by: STUDENT IN AN ORGANIZED HEALTH CARE EDUCATION/TRAINING PROGRAM

## 2023-09-25 PROCEDURE — 80053 COMPREHEN METABOLIC PANEL: CPT

## 2023-09-25 PROCEDURE — 36415 COLL VENOUS BLD VENIPUNCTURE: CPT

## 2023-09-25 PROCEDURE — 82977 ASSAY OF GGT: CPT

## 2023-09-25 RX ORDER — OMEPRAZOLE 40 MG/1
40 CAPSULE, DELAYED RELEASE ORAL DAILY
Qty: 30 CAPSULE | Refills: 1 | Status: SHIPPED | OUTPATIENT
Start: 2023-09-25

## 2023-09-25 NOTE — LETTER
September 25, 2023     Patient: Thor Hernandez  YOB: 2005  Date of Visit: 9/25/2023      To Whom it May Concern:    Thor Hernandez is under my professional care. Jaquelin Whitney was seen in my office on 9/25/2023. Jaquelin Whitney can return to work 9/26/2023, with no restrictions. If you have any questions or concerns, please don't hesitate to call.          Sincerely,          April Fuentes DO        CC: No Recipients

## 2023-09-25 NOTE — PROGRESS NOTES
[facility]  Outpatient Follow-up  Encounter: [id number]    PATIENT INFO     Name: Suzanna Villanueva  YOB: 2005   Age: 19 y.o. Sex: female   MRN: [id number]    [id number] [address] Waters is a 19 y.o. female with left upper quadrant pain and elevated alkaline phosphatase level. Cause for the pain remains unclear although may not be GI in etiology given location. Suspect musculoskeletal cause or possible costochondritis. However would be atypical given chronicity. Possible GI etiologies include peptic ulcer disease versus H. pylori infection versus functional disorders such as IBS. Cause for elevated alkaline phosphatase level unclear. Patient is status post cholecystectomy. May be nonhepatic in nature. • Schedule for upper endoscopy  • Obtain gastric and duodenal biopsies  • Increase omeprazole to 40 mg daily  • If symptoms do not improve on increased omeprazole dose, would recommend decreasing back to 20 mg daily  • Repeat CMP and check GGT    1. Left upper quadrant abdominal pain    2. Upper abdominal pain    3. Elevated alkaline phosphatase level      Orders Placed This Encounter   Procedures   • Comprehensive metabolic panel   • Gamma GT   • EGD       FOLLOW-UP: 3 months or sooner if needed    [address] [facility]       Suzanna Villanueva is a 19 y.o. female who presents to GI office for follow-up of left upper quadrant abdominal pain. Pain has been ongoing for the last 3 years. She did have a period of pain relief after cholecystectomy but has had recurrence about a month ago. Pain is intermittent without clear exacerbating factor apart from deep breathing and movement. There does not appear to be any association with dietary habits or bowel habits. Reports that she did have pain relief previously with omeprazole 20 mg daily but medication is no longer effective. Has been to the ED for evaluation with no clear etiology identified.   Patient reports that she was previously told that if pain recurred, she would need endoscopy to rule out GI causes for pain. Also noted to have slightly elevated alkaline phosphatase level. Remainder of LFTs within normal limits. She is status postcholecystectomy. ENDOSCOPIC HISTORY     UPPER ENDOSCOPY: None    COLONOSCOPY: None    REVIEW OF SYSTEMS     CONSTITUTIONAL: Denies any fever, chills, rigors, and weight loss  HEENT: No earache or tinnitus, denies hearing loss or visual disturbances  CARDIOVASCULAR: No chest pain or palpitations  RESPIRATORY: Denies any cough, hemoptysis, shortness of breath or dyspnea on exertion  GASTROINTESTINAL: As noted in the History of Present Illness  GENITOURINARY: No problems with urination, denies any hematuria or dysuria  NEUROLOGIC: No dizziness or vertigo, denies headaches   MUSCULOSKELETAL: Denies any muscle or joint pain   SKIN: Denies skin rashes or itching  ENDOCRINE: Denies excessive thirst, denies intolerance to heat or cold  PSYCHOSOCIAL: Denies depression or anxiety, denies any recent memory loss     Historical Information   Past Medical History:   Diagnosis Date   • Asthma      Past Surgical History:   Procedure Laterality Date   • CHOLECYSTECTOMY LAPAROSCOPIC N/A 7/15/2022    Procedure: CHOLECYSTECTOMY LAPAROSCOPIC;  Surgeon: Richi Najera DO;  Location: [facility];  Service: General     Social History   Social History     Substance and Sexual Activity   Alcohol Use Never     Social History     Substance and Sexual Activity   Drug Use Never     Social History     Tobacco Use   Smoking Status Never   Smokeless Tobacco Never     Family History   Problem Relation Age of Onset   • No Known Problems Father        MEDICATIONS & ALLERGIES     Current Outpatient Medications   Medication Instructions   • medroxyPROGESTERone (DEPO-PROVERA) 150 mg/mL injection No dose, route, or frequency recorded.    • omeprazole (PRILOSEC) 40 mg, Oral, Daily     No Known Allergies    PHYSICAL EXAM      Objective   Blood pressure 115/81, pulse 83, temperature 97.7 °F (36.5 °C), temperature source Tympanic, height 5' 3" (1.6 m), weight 101 kg (222 lb), SpO2 97 %. Body mass index is 39.33 kg/m². General Appearance:   Alert, cooperative, no distress   HEENT:   Normocephalic, atraumatic, anicteric     Neck:   Supple, symmetrical, trachea midline   Lungs:   Equal chest rise, respirations unlabored    Heart:   Regular rate and rhythm   Abdomen:   Soft, tenderness to deep palpation in left subcostal region, non-distended; normal bowel sounds; no masses, no organomegaly    Rectal:   Deferred    Extremities:   No cyanosis, clubbing or edema    Neuro: Moves all 4 extremities    Skin:   No jaundice, rashes, or lesions      LABORATORY RESULTS     No visits with results within 1 Day(s) from this visit.    Latest known visit with results is:   Admission on 08/02/2023, Discharged on 08/02/2023   Component Date Value   • WBC 08/02/2023 10.69 (H)    • RBC 08/02/2023 4.60    • Hemoglobin 08/02/2023 12.3    • Hematocrit 08/02/2023 38.4    • MCV 08/02/2023 84    • MCH 08/02/2023 26.7 (L)    • MCHC 08/02/2023 32.0    • RDW 08/02/2023 13.3    • MPV 08/02/2023 10.1    • Platelets 30/35/7009 336    • nRBC 08/02/2023 0    • Neutrophils Relative 08/02/2023 68    • Immat GRANS % 08/02/2023 0    • Lymphocytes Relative 08/02/2023 22    • Monocytes Relative 08/02/2023 8    • Eosinophils Relative 08/02/2023 1    • Basophils Relative 08/02/2023 1    • Neutrophils Absolute 08/02/2023 7.33    • Immature Grans Absolute 08/02/2023 0.03    • Lymphocytes Absolute 08/02/2023 2.35    • Monocytes Absolute 08/02/2023 0.84    • Eosinophils Absolute 08/02/2023 0.08    • Basophils Absolute 08/02/2023 0.06    • Sodium 08/02/2023 138    • Potassium 08/02/2023 3.7    • Chloride 08/02/2023 107    • CO2 08/02/2023 23    • ANION GAP 08/02/2023 8    • BUN 08/02/2023 7    • Creatinine 08/02/2023 0.71    • Glucose 08/02/2023 96    • Calcium 08/02/2023 9.8    • AST 08/02/2023 17    • ALT 08/02/2023 13    • Alkaline Phosphatase 08/02/2023 118 (H)    • Total Protein 08/02/2023 8.0    • Albumin 08/02/2023 4.4    • Total Bilirubin 08/02/2023 0.46    • eGFR 08/02/2023 122    • D-Dimer, Quant 08/02/2023 <0.27    • hs TnI 0hr 08/02/2023 <2    • Lipase 08/02/2023 22    • EXT Preg Test, Ur 08/02/2023 Negative    • Control 08/02/2023 Valid    • Color, UA 08/02/2023 Yellow    • Clarity, UA 08/02/2023 Clear    • Specific Gravity, UA 08/02/2023 >=1.030    • pH, UA 08/02/2023 6.0    • Leukocytes, UA 08/02/2023 Negative    • Nitrite, UA 08/02/2023 Negative    • Protein, UA 08/02/2023 Negative    • Glucose, UA 08/02/2023 Negative    • Ketones, UA 08/02/2023 Negative    • Urobilinogen, UA 08/02/2023 0.2    • Bilirubin, UA 08/02/2023 Negative    • Occult Blood, UA 08/02/2023 Negative    • Ventricular Rate 08/02/2023 79    • Atrial Rate 08/02/2023 79    • IN Interval 08/02/2023 142    • QRSD Interval 08/02/2023 84    • QT Interval 08/02/2023 378    • QTC Interval 08/02/2023 433    • P Axis 08/02/2023 15    • QRS Axis 08/02/2023 43    • T Wave Axis 08/02/2023 21         IMAGING RESULTS     No results found. I have personally reviewed pertinent imaging study reports. Romayne Robertson, D.O. [address] [facility]  Division of Gastroenterology & Hepatology  Available on [email or web address]    ** Please Note: This note is constructed using a voice recognition dictation system.  **

## 2023-09-25 NOTE — H&P (VIEW-ONLY)
[facility]  Outpatient Follow-up  Encounter: [id number]    PATIENT INFO     Name: Shonna Hardy  YOB: 2005   Age: 19 y.o. Sex: female   MRN: [id number]    [id number] Bin Waters is a 19 y.o. female with left upper quadrant pain and elevated alkaline phosphatase level. Cause for the pain remains unclear although may not be GI in etiology given location. Suspect musculoskeletal cause or possible costochondritis. However would be atypical given chronicity. Possible GI etiologies include peptic ulcer disease versus H. pylori infection versus functional disorders such as IBS. Cause for elevated alkaline phosphatase level unclear. Patient is status post cholecystectomy. May be nonhepatic in nature. • Schedule for upper endoscopy  • Obtain gastric and duodenal biopsies  • Increase omeprazole to 40 mg daily  • If symptoms do not improve on increased omeprazole dose, would recommend decreasing back to 20 mg daily  • Repeat CMP and check GGT    1. Left upper quadrant abdominal pain    2. Upper abdominal pain    3. Elevated alkaline phosphatase level      Orders Placed This Encounter   Procedures   • Comprehensive metabolic panel   • Gamma GT   • EGD       FOLLOW-UP: 3 months or sooner if needed    [address] [facility]       Shonna Hardy is a 19 y.o. female who presents to GI office for follow-up of left upper quadrant abdominal pain. Pain has been ongoing for the last 3 years. She did have a period of pain relief after cholecystectomy but has had recurrence about a month ago. Pain is intermittent without clear exacerbating factor apart from deep breathing and movement. There does not appear to be any association with dietary habits or bowel habits. Reports that she did have pain relief previously with omeprazole 20 mg daily but medication is no longer effective. Has been to the ED for evaluation with no clear etiology identified.   Patient reports that she was previously told that if pain recurred, she would need endoscopy to rule out GI causes for pain. Also noted to have slightly elevated alkaline phosphatase level. Remainder of LFTs within normal limits. She is status postcholecystectomy. ENDOSCOPIC HISTORY     UPPER ENDOSCOPY: None    COLONOSCOPY: None    REVIEW OF SYSTEMS     CONSTITUTIONAL: Denies any fever, chills, rigors, and weight loss  HEENT: No earache or tinnitus, denies hearing loss or visual disturbances  CARDIOVASCULAR: No chest pain or palpitations  RESPIRATORY: Denies any cough, hemoptysis, shortness of breath or dyspnea on exertion  GASTROINTESTINAL: As noted in the History of Present Illness  GENITOURINARY: No problems with urination, denies any hematuria or dysuria  NEUROLOGIC: No dizziness or vertigo, denies headaches   MUSCULOSKELETAL: Denies any muscle or joint pain   SKIN: Denies skin rashes or itching  ENDOCRINE: Denies excessive thirst, denies intolerance to heat or cold  PSYCHOSOCIAL: Denies depression or anxiety, denies any recent memory loss     Historical Information   Past Medical History:   Diagnosis Date   • Asthma      Past Surgical History:   Procedure Laterality Date   • CHOLECYSTECTOMY LAPAROSCOPIC N/A 7/15/2022    Procedure: CHOLECYSTECTOMY LAPAROSCOPIC;  Surgeon: Martha Emerson DO;  Location: [facility];  Service: General     Social History   Social History     Substance and Sexual Activity   Alcohol Use Never     Social History     Substance and Sexual Activity   Drug Use Never     Social History     Tobacco Use   Smoking Status Never   Smokeless Tobacco Never     Family History   Problem Relation Age of Onset   • No Known Problems Father        MEDICATIONS & ALLERGIES     Current Outpatient Medications   Medication Instructions   • medroxyPROGESTERone (DEPO-PROVERA) 150 mg/mL injection No dose, route, or frequency recorded.    • omeprazole (PRILOSEC) 40 mg, Oral, Daily     No Known Allergies    PHYSICAL EXAM      Objective   Blood pressure 115/81, pulse 83, temperature 97.7 °F (36.5 °C), temperature source Tympanic, height 5' 3" (1.6 m), weight 101 kg (222 lb), SpO2 97 %. Body mass index is 39.33 kg/m². General Appearance:   Alert, cooperative, no distress   HEENT:   Normocephalic, atraumatic, anicteric     Neck:   Supple, symmetrical, trachea midline   Lungs:   Equal chest rise, respirations unlabored    Heart:   Regular rate and rhythm   Abdomen:   Soft, tenderness to deep palpation in left subcostal region, non-distended; normal bowel sounds; no masses, no organomegaly    Rectal:   Deferred    Extremities:   No cyanosis, clubbing or edema    Neuro: Moves all 4 extremities    Skin:   No jaundice, rashes, or lesions      LABORATORY RESULTS     No visits with results within 1 Day(s) from this visit.    Latest known visit with results is:   Admission on 08/02/2023, Discharged on 08/02/2023   Component Date Value   • WBC 08/02/2023 10.69 (H)    • RBC 08/02/2023 4.60    • Hemoglobin 08/02/2023 12.3    • Hematocrit 08/02/2023 38.4    • MCV 08/02/2023 84    • MCH 08/02/2023 26.7 (L)    • MCHC 08/02/2023 32.0    • RDW 08/02/2023 13.3    • MPV 08/02/2023 10.1    • Platelets 38/56/3906 336    • nRBC 08/02/2023 0    • Neutrophils Relative 08/02/2023 68    • Immat GRANS % 08/02/2023 0    • Lymphocytes Relative 08/02/2023 22    • Monocytes Relative 08/02/2023 8    • Eosinophils Relative 08/02/2023 1    • Basophils Relative 08/02/2023 1    • Neutrophils Absolute 08/02/2023 7.33    • Immature Grans Absolute 08/02/2023 0.03    • Lymphocytes Absolute 08/02/2023 2.35    • Monocytes Absolute 08/02/2023 0.84    • Eosinophils Absolute 08/02/2023 0.08    • Basophils Absolute 08/02/2023 0.06    • Sodium 08/02/2023 138    • Potassium 08/02/2023 3.7    • Chloride 08/02/2023 107    • CO2 08/02/2023 23    • ANION GAP 08/02/2023 8    • BUN 08/02/2023 7    • Creatinine 08/02/2023 0.71    • Glucose 08/02/2023 96    • Calcium 08/02/2023 9.8    • AST 08/02/2023 17    • ALT 08/02/2023 13    • Alkaline Phosphatase 08/02/2023 118 (H)    • Total Protein 08/02/2023 8.0    • Albumin 08/02/2023 4.4    • Total Bilirubin 08/02/2023 0.46    • eGFR 08/02/2023 122    • D-Dimer, Quant 08/02/2023 <0.27    • hs TnI 0hr 08/02/2023 <2    • Lipase 08/02/2023 22    • EXT Preg Test, Ur 08/02/2023 Negative    • Control 08/02/2023 Valid    • Color, UA 08/02/2023 Yellow    • Clarity, UA 08/02/2023 Clear    • Specific Gravity, UA 08/02/2023 >=1.030    • pH, UA 08/02/2023 6.0    • Leukocytes, UA 08/02/2023 Negative    • Nitrite, UA 08/02/2023 Negative    • Protein, UA 08/02/2023 Negative    • Glucose, UA 08/02/2023 Negative    • Ketones, UA 08/02/2023 Negative    • Urobilinogen, UA 08/02/2023 0.2    • Bilirubin, UA 08/02/2023 Negative    • Occult Blood, UA 08/02/2023 Negative    • Ventricular Rate 08/02/2023 79    • Atrial Rate 08/02/2023 79    • OH Interval 08/02/2023 142    • QRSD Interval 08/02/2023 84    • QT Interval 08/02/2023 378    • QTC Interval 08/02/2023 433    • P Axis 08/02/2023 15    • QRS Axis 08/02/2023 43    • T Wave Axis 08/02/2023 21         IMAGING RESULTS     No results found. I have personally reviewed pertinent imaging study reports. Filomena Soria D.O. [address] [facility]  Division of Gastroenterology & Hepatology  Available on [email or web address]    ** Please Note: This note is constructed using a voice recognition dictation system.  **

## 2023-10-20 ENCOUNTER — ANESTHESIA (OUTPATIENT)
Dept: PERIOP | Facility: HOSPITAL | Age: 18
End: 2023-10-20

## 2023-10-20 ENCOUNTER — HOSPITAL ENCOUNTER (OUTPATIENT)
Dept: PERIOP | Facility: HOSPITAL | Age: 18
Setting detail: OUTPATIENT SURGERY
End: 2023-10-20
Attending: STUDENT IN AN ORGANIZED HEALTH CARE EDUCATION/TRAINING PROGRAM
Payer: COMMERCIAL

## 2023-10-20 ENCOUNTER — ANESTHESIA EVENT (OUTPATIENT)
Dept: PERIOP | Facility: HOSPITAL | Age: 18
End: 2023-10-20

## 2023-10-20 VITALS
HEIGHT: 63 IN | SYSTOLIC BLOOD PRESSURE: 100 MMHG | RESPIRATION RATE: 18 BRPM | BODY MASS INDEX: 39.34 KG/M2 | TEMPERATURE: 97.5 F | WEIGHT: 222 LBS | HEART RATE: 87 BPM | DIASTOLIC BLOOD PRESSURE: 71 MMHG | OXYGEN SATURATION: 100 %

## 2023-10-20 DIAGNOSIS — R10.12 LEFT UPPER QUADRANT ABDOMINAL PAIN: ICD-10-CM

## 2023-10-20 DIAGNOSIS — R10.10 UPPER ABDOMINAL PAIN: ICD-10-CM

## 2023-10-20 DIAGNOSIS — K44.9 HIATAL HERNIA: Primary | ICD-10-CM

## 2023-10-20 DIAGNOSIS — K21.9 GASTROESOPHAGEAL REFLUX DISEASE WITHOUT ESOPHAGITIS: ICD-10-CM

## 2023-10-20 LAB
EXT PREGNANCY TEST URINE: NEGATIVE
EXT. CONTROL: NORMAL

## 2023-10-20 PROCEDURE — 88305 TISSUE EXAM BY PATHOLOGIST: CPT | Performed by: PATHOLOGY

## 2023-10-20 PROCEDURE — 81025 URINE PREGNANCY TEST: CPT | Performed by: ANESTHESIOLOGY

## 2023-10-20 PROCEDURE — 43239 EGD BIOPSY SINGLE/MULTIPLE: CPT | Performed by: STUDENT IN AN ORGANIZED HEALTH CARE EDUCATION/TRAINING PROGRAM

## 2023-10-20 RX ORDER — PROPOFOL 10 MG/ML
INJECTION, EMULSION INTRAVENOUS AS NEEDED
Status: DISCONTINUED | OUTPATIENT
Start: 2023-10-20 | End: 2023-10-20

## 2023-10-20 RX ORDER — ALBUTEROL SULFATE 2.5 MG/3ML
2.5 SOLUTION RESPIRATORY (INHALATION) ONCE AS NEEDED
Status: DISCONTINUED | OUTPATIENT
Start: 2023-10-20 | End: 2023-10-24 | Stop reason: HOSPADM

## 2023-10-20 RX ORDER — OMEPRAZOLE 40 MG/1
40 CAPSULE, DELAYED RELEASE ORAL DAILY
Qty: 90 CAPSULE | Refills: 1 | Status: SHIPPED | OUTPATIENT
Start: 2023-10-20

## 2023-10-20 RX ORDER — SODIUM CHLORIDE, SODIUM LACTATE, POTASSIUM CHLORIDE, CALCIUM CHLORIDE 600; 310; 30; 20 MG/100ML; MG/100ML; MG/100ML; MG/100ML
INJECTION, SOLUTION INTRAVENOUS CONTINUOUS PRN
Status: DISCONTINUED | OUTPATIENT
Start: 2023-10-20 | End: 2023-10-20

## 2023-10-20 RX ORDER — LIDOCAINE HYDROCHLORIDE 20 MG/ML
INJECTION, SOLUTION EPIDURAL; INFILTRATION; INTRACAUDAL; PERINEURAL AS NEEDED
Status: DISCONTINUED | OUTPATIENT
Start: 2023-10-20 | End: 2023-10-20

## 2023-10-20 RX ORDER — SODIUM CHLORIDE, SODIUM LACTATE, POTASSIUM CHLORIDE, CALCIUM CHLORIDE 600; 310; 30; 20 MG/100ML; MG/100ML; MG/100ML; MG/100ML
100 INJECTION, SOLUTION INTRAVENOUS CONTINUOUS
Status: DISCONTINUED | OUTPATIENT
Start: 2023-10-20 | End: 2023-10-24 | Stop reason: HOSPADM

## 2023-10-20 RX ORDER — ONDANSETRON 2 MG/ML
4 INJECTION INTRAMUSCULAR; INTRAVENOUS ONCE AS NEEDED
Status: DISCONTINUED | OUTPATIENT
Start: 2023-10-20 | End: 2023-10-24 | Stop reason: HOSPADM

## 2023-10-20 RX ADMIN — PROPOFOL 100 MG: 10 INJECTION, EMULSION INTRAVENOUS at 13:08

## 2023-10-20 RX ADMIN — PROPOFOL 50 MG: 10 INJECTION, EMULSION INTRAVENOUS at 13:11

## 2023-10-20 RX ADMIN — PROPOFOL 50 MG: 10 INJECTION, EMULSION INTRAVENOUS at 13:13

## 2023-10-20 RX ADMIN — SODIUM CHLORIDE, SODIUM LACTATE, POTASSIUM CHLORIDE, AND CALCIUM CHLORIDE 100 ML/HR: .6; .31; .03; .02 INJECTION, SOLUTION INTRAVENOUS at 12:30

## 2023-10-20 RX ADMIN — LIDOCAINE HYDROCHLORIDE 100 MG: 20 INJECTION, SOLUTION EPIDURAL; INFILTRATION; INTRACAUDAL; PERINEURAL at 13:08

## 2023-10-20 RX ADMIN — PROPOFOL 50 MG: 10 INJECTION, EMULSION INTRAVENOUS at 13:10

## 2023-10-20 RX ADMIN — PROPOFOL 50 MG: 10 INJECTION, EMULSION INTRAVENOUS at 13:09

## 2023-10-20 RX ADMIN — PROPOFOL 20 MG: 10 INJECTION, EMULSION INTRAVENOUS at 13:14

## 2023-10-20 RX ADMIN — PROPOFOL 50 MG: 10 INJECTION, EMULSION INTRAVENOUS at 13:12

## 2023-10-20 RX ADMIN — SODIUM CHLORIDE, SODIUM LACTATE, POTASSIUM CHLORIDE, AND CALCIUM CHLORIDE: .6; .31; .03; .02 INJECTION, SOLUTION INTRAVENOUS at 12:49

## 2023-10-20 NOTE — INTERVAL H&P NOTE
H&P reviewed. After examining the patient I find no changes in the patients condition since the H&P had been written.     Vitals:    10/20/23 1206   BP: 111/62   Pulse: 74   Resp: 18   Temp: 97.9 °F (36.6 °C)   SpO2: 99%

## 2023-10-20 NOTE — ANESTHESIA POSTPROCEDURE EVALUATION
Post-Op Assessment Note    CV Status:  Stable  Pain Score: 0    Pain management: adequate     Mental Status:  Sleepy and arousable   Hydration Status:  Euvolemic   PONV Controlled:  Controlled   Airway Patency:  Patent      Post Op Vitals Reviewed: Yes      Staff: Anesthesiologist, CRNA         No notable events documented.     BP 99/51 (10/20/23 1323)    Temp 97.9 °F (36.6 °C) (10/20/23 1323)    Pulse (!) 110 (10/20/23 1323)   Resp 20 (10/20/23 1323)    SpO2 94 % (10/20/23 1323)

## 2023-10-20 NOTE — ANESTHESIA PREPROCEDURE EVALUATION
Procedure:  EGD    Relevant Problems   No relevant active problems     Asthma  GERD  BMI 39    Physical Exam    Airway    Mallampati score: II  TM Distance: >3 FB  Neck ROM: full     Dental       Cardiovascular  Cardiovascular exam normal    Pulmonary  Pulmonary exam normal     Other Findings        Anesthesia Plan  ASA Score- 2     Anesthesia Type- IV sedation with anesthesia with ASA Monitors. Additional Monitors:     Airway Plan:            Plan Factors-    Chart reviewed. EKG reviewed. Existing labs reviewed. Patient summary reviewed. Induction- intravenous. Postoperative Plan-     Informed Consent- Anesthetic plan and risks discussed with patient. I personally reviewed this patient with the CRNA. Discussed and agreed on the Anesthesia Plan with the CRNA. Angela Navarro

## 2023-10-24 PROCEDURE — 88305 TISSUE EXAM BY PATHOLOGIST: CPT | Performed by: PATHOLOGY

## 2024-01-01 DIAGNOSIS — K21.9 GASTROESOPHAGEAL REFLUX DISEASE WITHOUT ESOPHAGITIS: ICD-10-CM

## 2024-01-01 DIAGNOSIS — K44.9 HIATAL HERNIA: ICD-10-CM

## 2024-01-01 DIAGNOSIS — R10.10 UPPER ABDOMINAL PAIN: ICD-10-CM

## 2024-01-03 RX ORDER — OMEPRAZOLE 40 MG/1
40 CAPSULE, DELAYED RELEASE ORAL DAILY
Qty: 90 CAPSULE | Refills: 1 | Status: SHIPPED | OUTPATIENT
Start: 2024-01-03

## (undated) DEVICE — GLOVE INDICATOR PI UNDERGLOVE SZ 7 BLUE

## (undated) DEVICE — SCD SEQUENTIAL COMPRESSION COMFORT SLEEVE MEDIUM KNEE LENGTH: Brand: KENDALL SCD

## (undated) DEVICE — TROCAR: Brand: KII FIOS FIRST ENTRY

## (undated) DEVICE — DRAPE EQUIPMENT RF WAND

## (undated) DEVICE — GLOVE PI ULTRA TOUCH SZ.8.0

## (undated) DEVICE — GLOVE SRG BIOGEL ECLIPSE 7

## (undated) DEVICE — PENCIL ELECTROSURG E-Z CLEAN -0035H

## (undated) DEVICE — ENDOPOUCH RETRIEVER SPECIMEN RETRIEVAL BAGS: Brand: ENDOPOUCH RETRIEVER

## (undated) DEVICE — 5 MM CURVED DISSECTORS WITH MONOPOLAR CAUTERY: Brand: ENDOPATH

## (undated) DEVICE — LIGHT GLOVE GREEN

## (undated) DEVICE — TRANSPOSAL ULTRAFLEX DUO/QUAD ULTRA CART MANIFOLD

## (undated) DEVICE — LIGAMAX 5 MM ENDOSCOPIC MULTIPLE CLIP APPLIER: Brand: LIGAMAX

## (undated) DEVICE — STRL COTTON TIP APPLCTR 6IN PK: Brand: CARDINAL HEALTH

## (undated) DEVICE — INTENDED FOR TISSUE SEPARATION, AND OTHER PROCEDURES THAT REQUIRE A SHARP SURGICAL BLADE TO PUNCTURE OR CUT.: Brand: BARD-PARKER SAFETY BLADES SIZE 15, STERILE

## (undated) DEVICE — GENERAL ENDOSCOPY PACK: Brand: CONVERTORS

## (undated) DEVICE — IRRIG ENDO FLO TUBING

## (undated) DEVICE — TROCARS: Brand: KII® BALLOON BLUNT TIP SYSTEM

## (undated) DEVICE — 3M™ TEGADERM™ TRANSPARENT FILM DRESSING FRAME STYLE, 1624W, 2-3/8 IN X 2-3/4 IN (6 CM X 7 CM), 100/CT 4CT/CASE: Brand: 3M™ TEGADERM™

## (undated) DEVICE — ELECTRODE LAP L WIRE E-Z CLEAN 33CM -0100

## (undated) DEVICE — SUT VICRYL 0 UR-6 27 IN J603H

## (undated) DEVICE — ENDOPATH 5MM CURVED SCISSORS WITH MONOPOLAR CAUTERY: Brand: ENDOPATH

## (undated) DEVICE — SYRINGE 10ML LL

## (undated) DEVICE — TUBING SMOKE EVAC W/FILTRATION DEVICE PLUMEPORT ACTIV

## (undated) DEVICE — GAUZE SPONGES,8 PLY: Brand: CURITY

## (undated) DEVICE — SPONGE LAP 18 X 18 IN

## (undated) DEVICE — PAD GROUNDING ADULT

## (undated) DEVICE — LAPAROSCOPIC SMOKE EVAC TUBING

## (undated) DEVICE — GLOVE INDICATOR PI UNDERGLOVE SZ 8 BLUE

## (undated) DEVICE — NEEDLE 25G X 1 1/2

## (undated) DEVICE — 3M™ STERI-STRIP™ REINFORCED ADHESIVE SKIN CLOSURES, R1546, 1/4 IN X 4 IN (6 MM X 100 MM), 10 STRIPS/ENVELOPE: Brand: 3M™ STERI-STRIP™

## (undated) DEVICE — SWABSTCK, BENZOIN TINCTURE, 1/PK, STRL: Brand: APLICARE

## (undated) DEVICE — PLUMEPEN PRO 10FT

## (undated) DEVICE — UTILITY MARKER,BLACK WITH LABELS: Brand: DEVON

## (undated) DEVICE — CHLORAPREP HI-LITE 26ML ORANGE

## (undated) DEVICE — SUT MONOCRYL 4-0 PS-2 27 IN Y426H

## (undated) DEVICE — ENDOPATH 5 MM GRASPERS WITH RATCHET HANDLES: Brand: ENDOPATH

## (undated) DEVICE — [HIGH FLOW INSUFFLATOR,  DO NOT USE IF PACKAGE IS DAMAGED,  KEEP DRY,  KEEP AWAY FROM SUNLIGHT,  PROTECT FROM HEAT AND RADIOACTIVE SOURCES.]: Brand: PNEUMOSURE

## (undated) DEVICE — ANTI-FOG SOLUTION WITH FOAM PAD: Brand: DEVON

## (undated) DEVICE — 1820 FOAM BLOCK NEEDLE COUNTER: Brand: DEVON

## (undated) DEVICE — BASIC SINGLE BASIN 2-LF: Brand: MEDLINE INDUSTRIES, INC.

## (undated) DEVICE — TROCAR: Brand: KII SLEEVE